# Patient Record
Sex: FEMALE | Race: WHITE | Employment: FULL TIME | URBAN - METROPOLITAN AREA
[De-identification: names, ages, dates, MRNs, and addresses within clinical notes are randomized per-mention and may not be internally consistent; named-entity substitution may affect disease eponyms.]

---

## 2023-04-06 LAB — MAMMOGRAPHY, EXTERNAL: NORMAL

## 2024-01-08 SDOH — HEALTH STABILITY: PHYSICAL HEALTH: ON AVERAGE, HOW MANY DAYS PER WEEK DO YOU ENGAGE IN MODERATE TO STRENUOUS EXERCISE (LIKE A BRISK WALK)?: 4 DAYS

## 2024-01-08 SDOH — HEALTH STABILITY: PHYSICAL HEALTH: ON AVERAGE, HOW MANY MINUTES DO YOU ENGAGE IN EXERCISE AT THIS LEVEL?: 30 MIN

## 2024-01-09 ENCOUNTER — OFFICE VISIT (OUTPATIENT)
Dept: FAMILY MEDICINE CLINIC | Age: 54
End: 2024-01-09
Payer: COMMERCIAL

## 2024-01-09 VITALS
BODY MASS INDEX: 25.74 KG/M2 | WEIGHT: 164 LBS | HEART RATE: 80 BPM | TEMPERATURE: 98.5 F | DIASTOLIC BLOOD PRESSURE: 80 MMHG | OXYGEN SATURATION: 94 % | SYSTOLIC BLOOD PRESSURE: 128 MMHG | HEIGHT: 67 IN

## 2024-01-09 DIAGNOSIS — R06.09 POST-COVID CHRONIC DYSPNEA: Primary | ICD-10-CM

## 2024-01-09 DIAGNOSIS — F98.8 ATTENTION DEFICIT DISORDER, UNSPECIFIED HYPERACTIVITY PRESENCE: ICD-10-CM

## 2024-01-09 DIAGNOSIS — U09.9 POST-COVID CHRONIC DYSPNEA: Primary | ICD-10-CM

## 2024-01-09 DIAGNOSIS — I10 ESSENTIAL HYPERTENSION: Chronic | ICD-10-CM

## 2024-01-09 DIAGNOSIS — L40.9 PSORIASIS: ICD-10-CM

## 2024-01-09 DIAGNOSIS — M43.8X2 OTHER SPECIFIED DEFORMING DORSOPATHIES, CERVICAL REGION: ICD-10-CM

## 2024-01-09 DIAGNOSIS — K76.89 FOCAL NODULAR HYPERPLASIA OF LIVER: ICD-10-CM

## 2024-01-09 DIAGNOSIS — M43.12 SPONDYLOLISTHESIS, CERVICAL REGION: ICD-10-CM

## 2024-01-09 PROCEDURE — 99204 OFFICE O/P NEW MOD 45 MIN: CPT | Performed by: NURSE PRACTITIONER

## 2024-01-09 PROCEDURE — 3074F SYST BP LT 130 MM HG: CPT | Performed by: NURSE PRACTITIONER

## 2024-01-09 PROCEDURE — 90472 IMMUNIZATION ADMIN EACH ADD: CPT | Performed by: NURSE PRACTITIONER

## 2024-01-09 PROCEDURE — 3079F DIAST BP 80-89 MM HG: CPT | Performed by: NURSE PRACTITIONER

## 2024-01-09 PROCEDURE — 90715 TDAP VACCINE 7 YRS/> IM: CPT | Performed by: NURSE PRACTITIONER

## 2024-01-09 PROCEDURE — 90471 IMMUNIZATION ADMIN: CPT | Performed by: NURSE PRACTITIONER

## 2024-01-09 PROCEDURE — 90750 HZV VACC RECOMBINANT IM: CPT | Performed by: NURSE PRACTITIONER

## 2024-01-09 RX ORDER — LISINOPRIL AND HYDROCHLOROTHIAZIDE 20; 12.5 MG/1; MG/1
1 TABLET ORAL DAILY
COMMUNITY

## 2024-01-09 RX ORDER — MELOXICAM 15 MG/1
15 TABLET ORAL DAILY
Qty: 30 TABLET | Refills: 1 | Status: SHIPPED | OUTPATIENT
Start: 2024-01-09

## 2024-01-09 RX ORDER — METHOCARBAMOL 750 MG/1
750 TABLET, FILM COATED ORAL PRN
COMMUNITY
Start: 2020-06-30

## 2024-01-09 RX ORDER — MELOXICAM 15 MG/1
15 TABLET ORAL DAILY
COMMUNITY
Start: 2020-06-30 | End: 2024-01-09 | Stop reason: SDUPTHER

## 2024-01-09 SDOH — ECONOMIC STABILITY: HOUSING INSECURITY
IN THE LAST 12 MONTHS, WAS THERE A TIME WHEN YOU DID NOT HAVE A STEADY PLACE TO SLEEP OR SLEPT IN A SHELTER (INCLUDING NOW)?: NO

## 2024-01-09 SDOH — ECONOMIC STABILITY: INCOME INSECURITY: HOW HARD IS IT FOR YOU TO PAY FOR THE VERY BASICS LIKE FOOD, HOUSING, MEDICAL CARE, AND HEATING?: NOT HARD AT ALL

## 2024-01-09 SDOH — ECONOMIC STABILITY: FOOD INSECURITY: WITHIN THE PAST 12 MONTHS, THE FOOD YOU BOUGHT JUST DIDN'T LAST AND YOU DIDN'T HAVE MONEY TO GET MORE.: NEVER TRUE

## 2024-01-09 SDOH — ECONOMIC STABILITY: FOOD INSECURITY: WITHIN THE PAST 12 MONTHS, YOU WORRIED THAT YOUR FOOD WOULD RUN OUT BEFORE YOU GOT MONEY TO BUY MORE.: NEVER TRUE

## 2024-01-09 ASSESSMENT — PATIENT HEALTH QUESTIONNAIRE - PHQ9
SUM OF ALL RESPONSES TO PHQ QUESTIONS 1-9: 0
2. FEELING DOWN, DEPRESSED OR HOPELESS: 0
DEPRESSION UNABLE TO ASSESS: FUNCTIONAL CAPACITY MOTIVATION LIMITS ACCURACY
1. LITTLE INTEREST OR PLEASURE IN DOING THINGS: 0
SUM OF ALL RESPONSES TO PHQ QUESTIONS 1-9: 0
SUM OF ALL RESPONSES TO PHQ QUESTIONS 1-9: 0
SUM OF ALL RESPONSES TO PHQ9 QUESTIONS 1 & 2: 0
SUM OF ALL RESPONSES TO PHQ QUESTIONS 1-9: 0

## 2024-01-09 NOTE — PROGRESS NOTES
controlled  Following with Derm            Patient engaged in shared decision making. Information given to evaluate options of treatment, understand what is needed and discussimportance of following plan.

## 2024-01-10 ASSESSMENT — ENCOUNTER SYMPTOMS
COUGH: 0
BACK PAIN: 0
DIARRHEA: 0
SHORTNESS OF BREATH: 0
WHEEZING: 0
SINUS PAIN: 0
COLOR CHANGE: 0
CONSTIPATION: 0
SINUS PRESSURE: 0
ABDOMINAL PAIN: 0

## 2024-01-10 NOTE — ASSESSMENT & PLAN NOTE
Ultrasound 11/2013  IMPRESSION:     1. Hypervascular enhancing lesion is identified within the lateral   segment of the left lobe of the liver. It has imaging characteristics   compatible with focal nodular hyperplasia.   2. Small simple cysts identified within the left lobe of liver as   detailed above.   3. No evidence of any acute abdominal process.

## 2024-01-10 NOTE — ASSESSMENT & PLAN NOTE
Underwent steroid injection in the past   Currently controlled with mobic and robaxin prn- takes seldomly   She is seeing muscle  regularly

## 2024-01-10 NOTE — ASSESSMENT & PLAN NOTE
Stable, controlled  Doing well on lisinopril hydrochlorothiazide  Would like to come off of this if possible

## 2024-01-11 NOTE — ASSESSMENT & PLAN NOTE
Reviewed PFTs  Continue albuterol as needed  Overall symptoms have improved with supplements  She is running marathons again   Declined

## 2024-02-02 RX ORDER — LISINOPRIL AND HYDROCHLOROTHIAZIDE 20; 12.5 MG/1; MG/1
1 TABLET ORAL DAILY
Qty: 30 TABLET | Refills: 2 | Status: SHIPPED | OUTPATIENT
Start: 2024-02-02

## 2024-02-02 NOTE — TELEPHONE ENCOUNTER
.Medication name:lisinopril-hydroCHLOROthiazide (PRINZIDE;ZESTORETIC) 20-12.5 MG per tablet   Medication dose:  Frequency:take one tablet by mouth daily  Quantity:30ablet  Pharmacy name:[facility]/PHARMACY #[id number] - [place] - [address] - P [phone] - F [phone] [[id number]]   Pharmacy number:  Last OV:1/9/24  Last Labs:

## 2024-03-13 ENCOUNTER — OFFICE VISIT (OUTPATIENT)
Dept: FAMILY MEDICINE CLINIC | Age: 54
End: 2024-03-13
Payer: COMMERCIAL

## 2024-03-13 VITALS
SYSTOLIC BLOOD PRESSURE: 118 MMHG | HEART RATE: 60 BPM | BODY MASS INDEX: 24.96 KG/M2 | DIASTOLIC BLOOD PRESSURE: 80 MMHG | TEMPERATURE: 98.5 F | WEIGHT: 159 LBS | HEIGHT: 67 IN | OXYGEN SATURATION: 97 %

## 2024-03-13 DIAGNOSIS — Z00.00 ROUTINE GENERAL MEDICAL EXAMINATION AT A HEALTH CARE FACILITY: ICD-10-CM

## 2024-03-13 DIAGNOSIS — I10 ESSENTIAL HYPERTENSION: Chronic | ICD-10-CM

## 2024-03-13 DIAGNOSIS — L40.9 PSORIASIS: ICD-10-CM

## 2024-03-13 DIAGNOSIS — Z00.00 ROUTINE GENERAL MEDICAL EXAMINATION AT A HEALTH CARE FACILITY: Primary | ICD-10-CM

## 2024-03-13 PROCEDURE — 3079F DIAST BP 80-89 MM HG: CPT | Performed by: NURSE PRACTITIONER

## 2024-03-13 PROCEDURE — 90471 IMMUNIZATION ADMIN: CPT | Performed by: NURSE PRACTITIONER

## 2024-03-13 PROCEDURE — 3074F SYST BP LT 130 MM HG: CPT | Performed by: NURSE PRACTITIONER

## 2024-03-13 PROCEDURE — 90750 HZV VACC RECOMBINANT IM: CPT | Performed by: NURSE PRACTITIONER

## 2024-03-13 PROCEDURE — 99396 PREV VISIT EST AGE 40-64: CPT | Performed by: NURSE PRACTITIONER

## 2024-03-13 ASSESSMENT — PATIENT HEALTH QUESTIONNAIRE - PHQ9
SUM OF ALL RESPONSES TO PHQ9 QUESTIONS 1 & 2: 0
SUM OF ALL RESPONSES TO PHQ QUESTIONS 1-9: 0
SUM OF ALL RESPONSES TO PHQ QUESTIONS 1-9: 0
2. FEELING DOWN, DEPRESSED OR HOPELESS: 0
SUM OF ALL RESPONSES TO PHQ QUESTIONS 1-9: 0
DEPRESSION UNABLE TO ASSESS: FUNCTIONAL CAPACITY MOTIVATION LIMITS ACCURACY
1. LITTLE INTEREST OR PLEASURE IN DOING THINGS: 0
SUM OF ALL RESPONSES TO PHQ QUESTIONS 1-9: 0

## 2024-03-13 ASSESSMENT — ENCOUNTER SYMPTOMS
CONSTIPATION: 0
BACK PAIN: 0
DIARRHEA: 0
COUGH: 0
SINUS PRESSURE: 0
WHEEZING: 0
SINUS PAIN: 0
SHORTNESS OF BREATH: 0
COLOR CHANGE: 0
ABDOMINAL PAIN: 0

## 2024-03-13 NOTE — ASSESSMENT & PLAN NOTE
Well exam in office   Discussed healthy diet and active lifestyle   Discussed vaccines   Reviewed and updated HM   Second Shingrix given today  Check labs

## 2024-03-14 LAB
ALBUMIN SERPL-MCNC: 4.6 G/DL (ref 3.4–5)
ALBUMIN/GLOB SERPL: 2.2 {RATIO} (ref 1.1–2.2)
ALP SERPL-CCNC: 63 U/L (ref 40–129)
ALT SERPL-CCNC: 12 U/L (ref 10–40)
ANION GAP SERPL CALCULATED.3IONS-SCNC: 10 MMOL/L (ref 3–16)
AST SERPL-CCNC: 18 U/L (ref 15–37)
BASOPHILS # BLD: 0.1 K/UL (ref 0–0.2)
BASOPHILS NFR BLD: 1 %
BILIRUB SERPL-MCNC: 0.4 MG/DL (ref 0–1)
BUN SERPL-MCNC: 15 MG/DL (ref 7–20)
CALCIUM SERPL-MCNC: 9.1 MG/DL (ref 8.3–10.6)
CHLORIDE SERPL-SCNC: 101 MMOL/L (ref 99–110)
CHOLEST SERPL-MCNC: 136 MG/DL (ref 0–199)
CO2 SERPL-SCNC: 27 MMOL/L (ref 21–32)
CREAT SERPL-MCNC: 0.7 MG/DL (ref 0.6–1.1)
DEPRECATED RDW RBC AUTO: 13 % (ref 12.4–15.4)
EOSINOPHIL # BLD: 0.2 K/UL (ref 0–0.6)
EOSINOPHIL NFR BLD: 3.6 %
GFR SERPLBLD CREATININE-BSD FMLA CKD-EPI: >60 ML/MIN/{1.73_M2}
GLUCOSE SERPL-MCNC: 87 MG/DL (ref 70–99)
HCT VFR BLD AUTO: 36.4 % (ref 36–48)
HDLC SERPL-MCNC: 39 MG/DL (ref 40–60)
HGB BLD-MCNC: 12.4 G/DL (ref 12–16)
LDL CHOLESTEROL CALCULATED: 83 MG/DL
LYMPHOCYTES # BLD: 1.8 K/UL (ref 1–5.1)
LYMPHOCYTES NFR BLD: 30.9 %
MCH RBC QN AUTO: 31.5 PG (ref 26–34)
MCHC RBC AUTO-ENTMCNC: 34.1 G/DL (ref 31–36)
MCV RBC AUTO: 92.4 FL (ref 80–100)
MONOCYTES # BLD: 0.5 K/UL (ref 0–1.3)
MONOCYTES NFR BLD: 7.8 %
NEUTROPHILS # BLD: 3.4 K/UL (ref 1.7–7.7)
NEUTROPHILS NFR BLD: 56.7 %
PLATELET # BLD AUTO: 229 K/UL (ref 135–450)
PMV BLD AUTO: 9.4 FL (ref 5–10.5)
POTASSIUM SERPL-SCNC: 4.9 MMOL/L (ref 3.5–5.1)
PROT SERPL-MCNC: 6.7 G/DL (ref 6.4–8.2)
RBC # BLD AUTO: 3.94 M/UL (ref 4–5.2)
SODIUM SERPL-SCNC: 138 MMOL/L (ref 136–145)
TRIGL SERPL-MCNC: 71 MG/DL (ref 0–150)
VLDLC SERPL CALC-MCNC: 14 MG/DL
WBC # BLD AUTO: 6 K/UL (ref 4–11)

## 2024-04-05 RX ORDER — LISINOPRIL AND HYDROCHLOROTHIAZIDE 20; 12.5 MG/1; MG/1
1 TABLET ORAL DAILY
Qty: 30 TABLET | Refills: 2 | Status: SHIPPED | OUTPATIENT
Start: 2024-04-05

## 2024-04-05 NOTE — TELEPHONE ENCOUNTER
Patient was out of town and left her lisinopril-hydroCHLOROthiazide (PRINZIDE;ZESTORETIC) 20-12.5 MG per tablet . Her family IS MAILING IT TO HER  AND IT WILL TAKE A WEEK TO GET HER MED.. SHE NEEDS A WEEKS WORTH. SENT TO [facility]/PHARMACY #[id number] - [place], [place] - [address] - P [phone] - F [phone] [[id number]]

## 2024-04-11 LAB — MAMMOGRAPHY, EXTERNAL: NORMAL

## 2024-04-12 PROBLEM — Z00.00 ROUTINE GENERAL MEDICAL EXAMINATION AT A HEALTH CARE FACILITY: Status: RESOLVED | Noted: 2024-03-13 | Resolved: 2024-04-12

## 2024-06-20 ENCOUNTER — OFFICE VISIT (OUTPATIENT)
Dept: FAMILY MEDICINE CLINIC | Age: 54
End: 2024-06-20
Payer: COMMERCIAL

## 2024-06-20 VITALS
HEART RATE: 69 BPM | TEMPERATURE: 98.6 F | HEIGHT: 67 IN | BODY MASS INDEX: 24.17 KG/M2 | WEIGHT: 154 LBS | OXYGEN SATURATION: 99 % | DIASTOLIC BLOOD PRESSURE: 84 MMHG | SYSTOLIC BLOOD PRESSURE: 128 MMHG

## 2024-06-20 DIAGNOSIS — U09.9 COVID-19 LONG HAULER: Primary | ICD-10-CM

## 2024-06-20 DIAGNOSIS — R06.02 SHORTNESS OF BREATH: ICD-10-CM

## 2024-06-20 DIAGNOSIS — R06.09 CHRONIC DYSPNEA: ICD-10-CM

## 2024-06-20 PROCEDURE — 3074F SYST BP LT 130 MM HG: CPT | Performed by: NURSE PRACTITIONER

## 2024-06-20 PROCEDURE — 99214 OFFICE O/P EST MOD 30 MIN: CPT | Performed by: NURSE PRACTITIONER

## 2024-06-20 PROCEDURE — 93000 ELECTROCARDIOGRAM COMPLETE: CPT | Performed by: NURSE PRACTITIONER

## 2024-06-20 PROCEDURE — 3079F DIAST BP 80-89 MM HG: CPT | Performed by: NURSE PRACTITIONER

## 2024-06-20 ASSESSMENT — ENCOUNTER SYMPTOMS
COUGH: 0
CONSTIPATION: 0
WHEEZING: 0
ABDOMINAL PAIN: 0
SINUS PAIN: 0
DIARRHEA: 0
BACK PAIN: 0
SINUS PRESSURE: 0
COLOR CHANGE: 0
SHORTNESS OF BREATH: 1

## 2024-06-20 NOTE — PROGRESS NOTES
Libby Brewer (:  1970) is a 54 y.o. female,Established patient, here for evaluation of the following chief complaint(s):  Other (Potential long covid)      ASSESSMENT/PLAN:  1. COVID-19 long hauler  -     CBC with Auto Differential; Future  -     Comprehensive Metabolic Panel; Future  -     Magnesium; Future  -     C-Reactive Protein; Future  -     Sedimentation Rate; Future  -     Ferritin; Future  -     TSH with Reflex; Future  -     Vitamin D 25 Hydroxy; Future  -     Vitamin B12; Future  -     Brain Natriuretic Peptide; Future  -     EKG 12 lead; Future  2. Shortness of breath  -     Echo (TTE) complete (PRN contrast/bubble/strain/3D); Future  3. Chronic dyspnea  Assessment & Plan:  Originally had some improvement but this has now halted  EKG shows normal sinus rhythm with right atrial enlargement  Will obtain echo  Check blood work  Consider pulmonology referral  Orders:  -     CBC with Auto Differential; Future  -     Comprehensive Metabolic Panel; Future  -     Magnesium; Future  -     C-Reactive Protein; Future  -     Sedimentation Rate; Future  -     Ferritin; Future  -     TSH with Reflex; Future  -     Vitamin D 25 Hydroxy; Future  -     Vitamin B12; Future  -     Brain Natriuretic Peptide; Future  -     EKG 12 lead; Future  -     Echo (TTE) complete (PRN contrast/bubble/strain/3D); Future      No follow-ups on file.    SUBJECTIVE/OBJECTIVE:  HPI  Patient is here for concern of ongoing dyspnea that has been present since her initial COVID infection about a year and a half ago.  She continues to have shortness of breath especially with activity.  States that she is really struggling to keep up with her running.  States that normally 2 to 5 miles would be fairly easy for her but she is really struggling to do even that.  This has been an ongoing issue.  She did have pulmonary function testing done several months ago which showed obstructive disease.  She did try using albuterol inhaler which she

## 2024-06-20 NOTE — ASSESSMENT & PLAN NOTE
Originally had some improvement but this has now halted  EKG shows normal sinus rhythm with right atrial enlargement  Will obtain echo  Check blood work  Consider pulmonology referral

## 2024-07-03 DIAGNOSIS — R79.89 ELEVATED FERRITIN: Primary | ICD-10-CM

## 2024-07-03 DIAGNOSIS — R79.89 ELEVATED FERRITIN: ICD-10-CM

## 2024-07-03 LAB
IRON SATN MFR SERPL: 33 % (ref 15–50)
IRON SERPL-MCNC: 118 UG/DL (ref 37–145)
TIBC SERPL-MCNC: 353 UG/DL (ref 260–445)
TRANSFERRIN SERPL-MCNC: 290 MG/DL (ref 200–360)

## 2024-07-04 LAB — ANA SER QL IA: NEGATIVE

## 2024-07-25 ENCOUNTER — PATIENT MESSAGE (OUTPATIENT)
Dept: FAMILY MEDICINE CLINIC | Age: 54
End: 2024-07-25

## 2024-07-25 DIAGNOSIS — R06.09 CHRONIC DYSPNEA: Primary | ICD-10-CM

## 2024-07-30 DIAGNOSIS — U09.9 COVID-19 LONG HAULER: Primary | ICD-10-CM

## 2024-08-06 DIAGNOSIS — U09.9 POST-COVID CHRONIC DYSPNEA: ICD-10-CM

## 2024-08-06 DIAGNOSIS — R06.09 POST-COVID CHRONIC DYSPNEA: ICD-10-CM

## 2024-08-06 DIAGNOSIS — R06.09 CHRONIC DYSPNEA: Primary | ICD-10-CM

## 2024-08-06 NOTE — TELEPHONE ENCOUNTER
Candi Garrett APRN - CNP 8/1/2024 2:15 PM EDT      ----- Message -----  From: Sreedhar Kelly MA  Sent: 8/1/2024 2:04 PM EDT  To: XIOMARA Atwood CNP  Subject: FW: Exercise intolerance       ----- Message -----  From: Libby Brewer  Sent: 8/1/2024 1:44 PM EDT  To: [facility] Staff  Subject: Exercise intolerance     Hello - I called to make an appointment with Dr. Thibodeaux but his next available appointment is not until October. I was told that he does not have a particular expertise in long-Covid nor does anyone else at that office. I was rather dismayed by the office staff's reaction when I mentioned this because she said there is \"nothing you can really do about that\" (long Covid)   She mentioned a clinic at  but apparently patients need to be referred there by a primary care physician. Since my issues are suspected to be related to Covid, I'd really like to find a pulmonologist who has some additional experience with long-Covid.     This is the clinic she was referring to (old article). [email or web address]     What do you recommend?

## 2024-09-04 ENCOUNTER — OFFICE VISIT (OUTPATIENT)
Age: 54
End: 2024-09-04
Payer: COMMERCIAL

## 2024-09-04 VITALS
HEART RATE: 57 BPM | OXYGEN SATURATION: 100 % | HEIGHT: 67 IN | BODY MASS INDEX: 24.01 KG/M2 | SYSTOLIC BLOOD PRESSURE: 148 MMHG | WEIGHT: 153 LBS | DIASTOLIC BLOOD PRESSURE: 90 MMHG

## 2024-09-04 DIAGNOSIS — R06.09 DOE (DYSPNEA ON EXERTION): Primary | ICD-10-CM

## 2024-09-04 DIAGNOSIS — R94.2 ABNORMAL PFT: ICD-10-CM

## 2024-09-04 DIAGNOSIS — Z87.891 FORMER TOBACCO USE: ICD-10-CM

## 2024-09-04 PROCEDURE — 3077F SYST BP >= 140 MM HG: CPT | Performed by: INTERNAL MEDICINE

## 2024-09-04 PROCEDURE — 3080F DIAST BP >= 90 MM HG: CPT | Performed by: INTERNAL MEDICINE

## 2024-09-04 PROCEDURE — 99204 OFFICE O/P NEW MOD 45 MIN: CPT | Performed by: INTERNAL MEDICINE

## 2024-09-04 NOTE — ASSESSMENT & PLAN NOTE
Unclear etiology, she is a marathon runner and has not been able to go back to her usual pace and distance, some obstruction on previous PFT but I am not able to see the full study.   - Ordered full PFT and bronchial provocation test.   - If her symptoms recur or worsen will get CPET.

## 2024-09-04 NOTE — PROGRESS NOTES
[facility] [facility]    OUTPATIENT INITIAL NOTE    SUBJECTIVE:   Referring Physician: Case SOLANO.     CHIEF COMPLAINT / HPI:    Libby is a 54 y.o. female that initially presented to clinic for evaluation of STROUD.   Used to be a marathon runner, last full was , last half was last year.   Reported she had COVID in  and in . Her second episode was much rougher.   Came here due to not being able to keep up with her previous times.   Feels she is constantly \"running uphill\".   Reported she went to a \"naturopath\" who gave her some \"enzymes for blood clots\" that made her feel better last time. Serrapeptase and Nattokinase.  She went for a sauna with a \"salt room\" in [place] that made her feel better.     Relevant Social History  Tobacco: Quit 20 years ago, but did it again 1 ppd for 2 years in [id number], before that 1 ppd, ~ 20 py.   Substances: None reported.   Occupational: Works at [employer].   Pets: Dog x1  Family history of pulmonary problems: None reported.     Past Medical History:    Past Medical History:   Diagnosis Date    Hypertension        Social History:    Social History     Tobacco Use   Smoking Status Former    Average packs/day: 1 pack/day for 10.0 years (10.0 ttl pk-yrs)    Types: Cigarettes    Start date:     Quit date: 6/15/2003    Years since quittin.2    Passive exposure: Past   Smokeless Tobacco Never   Tobacco Comments    sporadic use after quitting.  Consistent use in  - .  Last cigarette 2022       Family History:  Family History   Problem Relation Age of Onset    Diabetes Mother     Stroke Mother      Current Medications:  Current Outpatient Medications on File Prior to Visit   Medication Sig Dispense Refill    lisinopril-hydroCHLOROthiazide (PRINZIDE;ZESTORETIC) 20-12.5 MG per tablet Take 1 tablet by mouth daily 30 tablet 2    ROBAXIN-750 750 MG tablet Take 1 tablet by mouth as needed

## 2024-09-04 NOTE — ASSESSMENT & PLAN NOTE
Noted some degree of obstructive physiology on previous PFT.   Unclear if there is actual obstruction vs reactive airway during the time of exam, smoking history is there but not overall significant enough to account for findings.   - Repeating PFT and bronchial challenge to evaluate airway hyperreactivity.

## 2024-10-07 ENCOUNTER — HOSPITAL ENCOUNTER (OUTPATIENT)
Dept: PULMONOLOGY | Age: 54
Discharge: HOME | End: 2024-10-07
Attending: INTERNAL MEDICINE

## 2024-10-07 ENCOUNTER — TELEPHONE (OUTPATIENT)
Dept: PULMONOLOGY | Age: 54
End: 2024-10-07

## 2024-10-07 RX ORDER — METHACHOLINE CHLORIDE 0-48MG/3ML
1 VIAL, NEBULIZER (ML) INHALATION ONCE
Status: DISCONTINUED | OUTPATIENT
Start: 2024-10-07 | End: 2024-10-10 | Stop reason: HOSPADM

## 2024-10-07 RX ORDER — ALBUTEROL SULFATE 0.83 MG/ML
2.5 SOLUTION RESPIRATORY (INHALATION) ONCE
Status: DISCONTINUED | OUTPATIENT
Start: 2024-10-07 | End: 2024-10-10 | Stop reason: HOSPADM

## 2024-10-07 NOTE — TELEPHONE ENCOUNTER
She called today to get clarification on where you want her testing completed at.  She states that you told her she needs to have these test completed at [facility] because they give more information that [facility] gives.  Please clarify. Thanks

## 2024-10-08 ENCOUNTER — PATIENT MESSAGE (OUTPATIENT)
Age: 54
End: 2024-10-08

## 2024-10-08 NOTE — TELEPHONE ENCOUNTER
Spoke with Ms Brewer today, she sis aware that PFTS can be done at [facility] [place].

## 2024-10-10 ENCOUNTER — HOSPITAL ENCOUNTER (OUTPATIENT)
Dept: PULMONOLOGY | Age: 54
Discharge: HOME | End: 2024-10-10
Attending: INTERNAL MEDICINE
Payer: COMMERCIAL

## 2024-10-10 VITALS — OXYGEN SATURATION: 96 %

## 2024-10-10 DIAGNOSIS — Z87.891 FORMER TOBACCO USE: ICD-10-CM

## 2024-10-10 DIAGNOSIS — R06.09 DOE (DYSPNEA ON EXERTION): ICD-10-CM

## 2024-10-10 PROCEDURE — 6370000000 HC RX 637 (ALT 250 FOR IP): Performed by: INTERNAL MEDICINE

## 2024-10-10 PROCEDURE — 94760 N-INVAS EAR/PLS OXIMETRY 1: CPT

## 2024-10-10 PROCEDURE — 6360000002 HC RX W HCPCS: Performed by: INTERNAL MEDICINE

## 2024-10-10 PROCEDURE — 94070 EVALUATION OF WHEEZING: CPT

## 2024-10-10 PROCEDURE — 94729 DIFFUSING CAPACITY: CPT

## 2024-10-10 PROCEDURE — 94726 PLETHYSMOGRAPHY LUNG VOLUMES: CPT

## 2024-10-10 PROCEDURE — 94010 BREATHING CAPACITY TEST: CPT

## 2024-10-10 RX ORDER — METHACHOLINE CHLORIDE 0-48MG/3ML
100 VIAL, NEBULIZER (ML) INHALATION EVERY 10 MIN PRN
Status: DISCONTINUED | OUTPATIENT
Start: 2024-10-10 | End: 2024-10-10

## 2024-10-10 RX ORDER — ALBUTEROL SULFATE 0.83 MG/ML
2.5 SOLUTION RESPIRATORY (INHALATION) ONCE
Status: COMPLETED | OUTPATIENT
Start: 2024-10-10 | End: 2024-10-10

## 2024-10-10 RX ORDER — ALBUTEROL SULFATE 90 UG/1
4 INHALANT RESPIRATORY (INHALATION) ONCE
Status: DISCONTINUED | OUTPATIENT
Start: 2024-10-10 | End: 2024-10-11 | Stop reason: HOSPADM

## 2024-10-10 RX ADMIN — ALBUTEROL SULFATE 2.5 MG: 2.5 SOLUTION RESPIRATORY (INHALATION) at 13:10

## 2024-10-10 RX ADMIN — METHACHOLINE CHLORIDE INHALATION SOLUTION 1 KIT: KIT at 12:48

## 2024-10-11 NOTE — PROCEDURES
PROCEDURE NOTE  Date: 10/10/2024   Name: Libby Brewer  YOB: 1970    Procedures    REASON FOR TEST:   Dyspnea on exertion     TEST RESULTS:     SPIROMETRY:  Spirometry quality is good.   FEV1/FVC ratio is: 67%.  FEV1 is 2.5 L, 73% of predicted while FVC is 3.7 L, 85% of predicted.  There is no bronchodilator response.     The shape of the flow volume curve is obstructive.     LUNG VOLUMES:  Total lung capacity is 86% of predicted.  Residual volume is 160% of predicted.  RV/TLC is 185%.  Expiratory residual volume is 9% of predicted.  This is likely reduced due to body habitus.     GAS DIFFUSION:  Diffusion capacity for carbon monoxide is 86% of predicted, corrected to hemoglobin level.     Methacholine challenge test:  Patient achieved PC 20 at medical in consideration of 0.95 which is consistent with moderate bronchial hyperresponsiveness       IMPRESSION:  Mild obstruction.  No bronchodilator response.  Air trapping.  Normal gas diffusion.  Moderate bronchial hyperresponsiveness on methacholine challenge test.  Clinical correlation recommended.    _____________________________________________________________  Electronically signed by:  Sabina Price MD,FACP    10/10/2024    9:46 PM.     [facility]  [address], [place] [address]   Phone (office): [phone]

## 2024-11-11 ENCOUNTER — OFFICE VISIT (OUTPATIENT)
Age: 54
End: 2024-11-11
Payer: COMMERCIAL

## 2024-11-11 VITALS
BODY MASS INDEX: 24.17 KG/M2 | WEIGHT: 154 LBS | OXYGEN SATURATION: 95 % | SYSTOLIC BLOOD PRESSURE: 128 MMHG | DIASTOLIC BLOOD PRESSURE: 76 MMHG | HEIGHT: 67 IN | HEART RATE: 63 BPM

## 2024-11-11 DIAGNOSIS — Z87.891 FORMER TOBACCO USE: ICD-10-CM

## 2024-11-11 DIAGNOSIS — J45.30 MILD PERSISTENT ASTHMA WITHOUT COMPLICATION: Primary | ICD-10-CM

## 2024-11-11 PROCEDURE — 3078F DIAST BP <80 MM HG: CPT | Performed by: INTERNAL MEDICINE

## 2024-11-11 PROCEDURE — 99214 OFFICE O/P EST MOD 30 MIN: CPT | Performed by: INTERNAL MEDICINE

## 2024-11-11 PROCEDURE — 3074F SYST BP LT 130 MM HG: CPT | Performed by: INTERNAL MEDICINE

## 2024-11-11 RX ORDER — ALBUTEROL SULFATE 90 UG/1
2 INHALANT RESPIRATORY (INHALATION) 4 TIMES DAILY PRN
Qty: 18 G | Refills: 5 | Status: SHIPPED | OUTPATIENT
Start: 2024-11-11

## 2024-11-11 RX ORDER — FLUTICASONE FUROATE 100 UG/1
1 POWDER RESPIRATORY (INHALATION) DAILY
Qty: 30 EACH | Refills: 4 | Status: SHIPPED | OUTPATIENT
Start: 2024-11-11

## 2024-11-11 NOTE — PROGRESS NOTES
[facility] [facility]ULMONARY AND CRITICAL CARE    OUTPATIENT INITIAL NOTE    SUBJECTIVE:   Referring Physician: Case SOLANO.     CHIEF COMPLAINT / HPI:    Libby is a 54 y.o. female that initially presented to clinic for evaluation of STROUD.   Used to be a marathon runner, last full was , last half was last year.   Reported she had COVID in  and in . Her second episode was much rougher.   Came here due to not being able to keep up with her previous times.   Feels she is constantly \"running uphill\".   Reported she went to a \"naturopath\" who gave her some \"enzymes for blood clots\" that made her feel better last time. Serrapeptase and Nattokinase.  She went for a sauna with a \"salt room\" in [place] that made her feel better.   Relevant Social History  Tobacco: Quit 20 years ago, but did it again 1 ppd for 2 years in [id number], before that 1 ppd, ~ 20 py.   Substances: None reported.   Occupational: Works at [employer].   Pets: Dog x1  Family history of pulmonary problems: None reported.     INTERVAL HISTORY:   - PFT showing obstruction and airway hyperreactivity.      Past Medical History:    Past Medical History:   Diagnosis Date    Hypertension        Social History:    Social History     Tobacco Use   Smoking Status Former    Average packs/day: 1 pack/day for 10.0 years (10.0 ttl pk-yrs)    Types: Cigarettes    Start date:     Quit date: 6/15/2003    Years since quittin.4    Passive exposure: Past   Smokeless Tobacco Never   Tobacco Comments    sporadic use after quitting.  Consistent use in  - .  Last cigarette 2022       Family History:  Family History   Problem Relation Age of Onset    Diabetes Mother     Stroke Mother     Hypertension Father     Lung Disease Maternal Uncle      Current Medications:  Current Outpatient Medications on File Prior to Visit   Medication Sig Dispense Refill    lisinopril-hydroCHLOROthiazide

## 2024-11-11 NOTE — ASSESSMENT & PLAN NOTE
PFT with mild obstruction, air-trapping and positive methacholine challenge. No significantly reported symptoms and unknown triggers. Mentioned her running times are slower but never thought about this being lung disease.   -Started low dose ICS, provided with inhaler list in case Arnuity is not covered  -Started MIREILLE PRN, instructed to experiment with it to see when she notices improvement.

## 2024-11-11 NOTE — PATIENT INSTRUCTIONS
PLEASE ASK YOUR PHARMACY OR INSURANCE ABOUT THE COVERAGE ON THE FOLLOWING INHALERS  Arnuity® Ellipta® (fluticasone turoate 100/200 mcg)  Asmanex® HFA (mometasone furoate 100/200 mcg)  Asmanex® Twisthaler (mometasone furoate 110/220 mcg)  Flovent Discus (fluticasone propionate 50/100/250 mcg)  Flovent® HFA (fluticasone propionate 44/110/220 mcg)*  Pulmicort® Flexhaler® (budesonide 90/180 mcg)*  QVAR® Redihaler™ (beclomethasone dipropionate 40/80 mcg)

## 2024-11-12 ENCOUNTER — TELEPHONE (OUTPATIENT)
Age: 54
End: 2024-11-12

## 2024-11-12 NOTE — TELEPHONE ENCOUNTER
Payer Waiting for Response  PA Detail   Deadline to reply: 2024  6:11 PM (In 1 week)  Payer: [place] - Commercial Case ID: [id number]    [phone]  [employer] Commercial Electronic PA Form ( NCPDP)   Cigna Prior Authorizations  Prior auth initiated by: [facility]/pharmacy #[id number] - [place], [place] - [address] - P [phone] - F [phone]  View History  Medication Being Authorized    fluticasone (ARNUITY ELLIPTA) 100 MCG/ACT AEPB  Inhale 1 Inhalation into the lungs daily  Dispense: 30 each Refills: 4   Start: 2024   Class: Normal Diagnoses: Mild persistent asthma without complication   This order has been released to its destination.  To be filled at: [facility]/pharmacy #[id number] [place], [place] - [address] - P [phone] - F [phone]  Pharmacy Benefits   Open Encounter CECE HWANG  -  [facility] [employer] ([employer])    Covered: Retail, Mail Order    Unknown: Specialty, Long-Term Care  Member ID: [id number] BIN: [id number] : 1970   Group ID: [id number] PCN: [id number] Legal sex: F   Group name: [employer]   Address: [address] [place], [place] [id number]

## 2024-11-12 NOTE — TELEPHONE ENCOUNTER
Submitted PA for Fluticasone Propionate Diskus 100MCG/ACT aerosol powder   Via CMM Key: [id number]  STATUS: PENDING.    Follow up done daily; if no decision with in three days we will refax.  If another three days goes by with no decision will call the insurance for status.

## 2024-11-12 NOTE — TELEPHONE ENCOUNTER
The medication was DENIED; DENIAL letter is uploaded to MEDIA.    Generic Denial:  Other; please see Denial Letter.         Note :        If you want an APPEAL; please note in this encounter what new information you would like to APPEAL with.  Once complete route back to PA POOL.    If this requires a response please respond to the pool ( P MHCX PSC MEDICATION PRE-AUTH).      Thank you please advise patient.

## 2024-11-20 DIAGNOSIS — J45.30 MILD PERSISTENT ASTHMA WITHOUT COMPLICATION: ICD-10-CM

## 2024-11-20 DIAGNOSIS — J45.30 MILD PERSISTENT ASTHMA WITHOUT COMPLICATION: Primary | ICD-10-CM

## 2024-12-24 RX ORDER — LISINOPRIL AND HYDROCHLOROTHIAZIDE 12.5; 2 MG/1; MG/1
1 TABLET ORAL DAILY
Qty: 90 TABLET | Refills: 1 | Status: SHIPPED | OUTPATIENT
Start: 2024-12-24

## 2025-02-05 DIAGNOSIS — J45.30 MILD PERSISTENT ASTHMA WITHOUT COMPLICATION: Primary | ICD-10-CM

## 2025-02-05 RX ORDER — BUDESONIDE AND FORMOTEROL FUMARATE DIHYDRATE 80; 4.5 UG/1; UG/1
2 AEROSOL RESPIRATORY (INHALATION) 2 TIMES DAILY
Qty: 10.2 G | Refills: 3 | Status: SHIPPED | OUTPATIENT
Start: 2025-02-05

## 2025-03-19 ENCOUNTER — PATIENT MESSAGE (OUTPATIENT)
Age: 55
End: 2025-03-19

## 2025-03-19 ENCOUNTER — PATIENT MESSAGE (OUTPATIENT)
Dept: FAMILY MEDICINE CLINIC | Age: 55
End: 2025-03-19

## 2025-03-19 NOTE — TELEPHONE ENCOUNTER
Notified patient that [facility] and [facility] are still discussing if they will come to an agreement. If agreement is not made by patients appt I let her know she will have to pay out of pocket costs. Or she is able to find a new pulmonology in her network.    Pt understands and will leave her appt for 6/2 until we hear more about the [facility] and [facility] agreement     No further action required.

## 2025-03-25 ENCOUNTER — HOSPITAL ENCOUNTER (OUTPATIENT)
Dept: PULMONOLOGY | Age: 55
Discharge: HOME | End: 2025-03-25
Attending: INTERNAL MEDICINE
Payer: COMMERCIAL

## 2025-03-25 DIAGNOSIS — J45.30 MILD PERSISTENT ASTHMA WITHOUT COMPLICATION: ICD-10-CM

## 2025-03-25 PROCEDURE — 94726 PLETHYSMOGRAPHY LUNG VOLUMES: CPT

## 2025-03-25 PROCEDURE — 6360000002 HC RX W HCPCS: Performed by: INTERNAL MEDICINE

## 2025-03-25 PROCEDURE — 94664 DEMO&/EVAL PT USE INHALER: CPT

## 2025-03-25 PROCEDURE — 94060 EVALUATION OF WHEEZING: CPT

## 2025-03-25 PROCEDURE — 94729 DIFFUSING CAPACITY: CPT

## 2025-03-25 PROCEDURE — 94760 N-INVAS EAR/PLS OXIMETRY 1: CPT

## 2025-03-25 RX ORDER — ALBUTEROL SULFATE 0.83 MG/ML
2.5 SOLUTION RESPIRATORY (INHALATION) ONCE
Status: COMPLETED | OUTPATIENT
Start: 2025-03-25 | End: 2025-03-25

## 2025-03-25 RX ADMIN — ALBUTEROL SULFATE 2.5 MG: 2.5 SOLUTION RESPIRATORY (INHALATION) at 13:08

## 2025-05-01 ENCOUNTER — PATIENT MESSAGE (OUTPATIENT)
Age: 55
End: 2025-05-01

## 2025-05-01 DIAGNOSIS — R94.2 ABNORMAL PFT: ICD-10-CM

## 2025-05-01 DIAGNOSIS — R06.09 DOE (DYSPNEA ON EXERTION): ICD-10-CM

## 2025-05-01 DIAGNOSIS — J45.30 MILD PERSISTENT ASTHMA WITHOUT COMPLICATION: Primary | ICD-10-CM

## 2025-05-01 NOTE — TELEPHONE ENCOUNTER
Called patient, she stated she wants to cancel the appt and will call back next March to further evaluate.  Put in the order for a pft to be done for estimated appt time in march 26'    No further action required.

## 2025-07-31 RX ORDER — LISINOPRIL AND HYDROCHLOROTHIAZIDE 12.5; 2 MG/1; MG/1
1 TABLET ORAL DAILY
Qty: 30 TABLET | Refills: 0 | Status: SHIPPED | OUTPATIENT
Start: 2025-07-31

## 2025-09-03 RX ORDER — LISINOPRIL AND HYDROCHLOROTHIAZIDE 12.5; 2 MG/1; MG/1
1 TABLET ORAL DAILY
Qty: 90 TABLET | Refills: 1 | Status: SHIPPED | OUTPATIENT
Start: 2025-09-03